# Patient Record
Sex: FEMALE | Race: BLACK OR AFRICAN AMERICAN | NOT HISPANIC OR LATINO | URBAN - METROPOLITAN AREA
[De-identification: names, ages, dates, MRNs, and addresses within clinical notes are randomized per-mention and may not be internally consistent; named-entity substitution may affect disease eponyms.]

---

## 2020-10-01 ENCOUNTER — OFFICE VISIT - HEALTHEAST (OUTPATIENT)
Dept: FAMILY MEDICINE | Facility: CLINIC | Age: 25
End: 2020-10-01

## 2020-10-01 DIAGNOSIS — Z00.00 ROUTINE GENERAL MEDICAL EXAMINATION AT A HEALTH CARE FACILITY: ICD-10-CM

## 2020-10-01 DIAGNOSIS — Z30.432 ENCOUNTER FOR REMOVAL OF INTRAUTERINE CONTRACEPTIVE DEVICE (IUD): ICD-10-CM

## 2020-10-01 DIAGNOSIS — Z11.3 ROUTINE SCREENING FOR STI (SEXUALLY TRANSMITTED INFECTION): ICD-10-CM

## 2020-10-01 DIAGNOSIS — E66.01 MORBID OBESITY WITH BMI OF 50.0-59.9, ADULT (H): ICD-10-CM

## 2020-10-01 DIAGNOSIS — Z30.09 COUNSELING FOR BIRTH CONTROL REGARDING INTRAUTERINE DEVICE (IUD): ICD-10-CM

## 2020-10-01 LAB
CHOLEST SERPL-MCNC: 193 MG/DL
CLUE CELLS: NORMAL
FASTING STATUS PATIENT QL REPORTED: YES
HBA1C MFR BLD: 5.3 %
HDLC SERPL-MCNC: 45 MG/DL
HIV 1+2 AB+HIV1 P24 AG SERPL QL IA: NEGATIVE
LDLC SERPL CALC-MCNC: 138 MG/DL
TRICHOMONAS, WET PREP: NORMAL
TRIGL SERPL-MCNC: 52 MG/DL
YEAST, WET PREP: NORMAL

## 2020-10-01 ASSESSMENT — MIFFLIN-ST. JEOR: SCORE: 2151.89

## 2020-10-02 LAB
C TRACH DNA SPEC QL PROBE+SIG AMP: NEGATIVE
N GONORRHOEA DNA SPEC QL NAA+PROBE: NEGATIVE
T PALLIDUM AB SER QL: NEGATIVE

## 2020-10-05 ENCOUNTER — COMMUNICATION - HEALTHEAST (OUTPATIENT)
Dept: FAMILY MEDICINE | Facility: CLINIC | Age: 25
End: 2020-10-05

## 2020-10-05 LAB
BKR LAB AP ABNORMAL BLEEDING: NO
BKR LAB AP BIRTH CONTROL/HORMONES: NORMAL
BKR LAB AP CERVICAL APPEARANCE: NORMAL
BKR LAB AP GYN ADEQUACY: NORMAL
BKR LAB AP GYN INTERPRETATION: NORMAL
BKR LAB AP GYN OTHER FINDINGS: NORMAL
BKR LAB AP HPV REFLEX: NORMAL
BKR LAB AP LMP: NORMAL
BKR LAB AP PATIENT STATUS: NORMAL
BKR LAB AP PREVIOUS ABNORMAL: NO
BKR LAB AP PREVIOUS NORMAL: NORMAL
HIGH RISK?: YES
PATH REPORT.COMMENTS IMP SPEC: NORMAL
RESULT FLAG (HE HISTORICAL CONVERSION): NORMAL

## 2020-10-08 ENCOUNTER — COMMUNICATION - HEALTHEAST (OUTPATIENT)
Dept: FAMILY MEDICINE | Facility: CLINIC | Age: 25
End: 2020-10-08

## 2021-06-04 VITALS
HEART RATE: 88 BPM | OXYGEN SATURATION: 99 % | WEIGHT: 293 LBS | TEMPERATURE: 98.4 F | DIASTOLIC BLOOD PRESSURE: 72 MMHG | BODY MASS INDEX: 47.09 KG/M2 | HEIGHT: 66 IN | SYSTOLIC BLOOD PRESSURE: 120 MMHG

## 2021-06-11 NOTE — PROGRESS NOTES
Assessment & Plan:     1. Routine screening for STI (sexually transmitted infection)  Last sexually active in 2018 and didn't have repeat chlamydia screening for test of cure. She would like to do blood screening in addition.   - Chlamydia trachomatis & Neisseria gonorrhoeae, Amplified Detection  - HIV Antigen/Antibody Screening [place]  - Syphilis Screen, [place]  - Wet Prep, Vaginal    2. Routine general medical examination at a health care facility  - Gynecologic Cytology (PAP Smear)    3. Morbid obesity with BMI of 50.0-59.9, adult (H)  I provided counseling regarding nutrition and exercise for weight loss. She had elevated LDL cholesterol in 2018 and is at risk for diabetes given family history and elevated BMI. Agreeable for screening today.  - Lipid [place] RANDOM  - Glycosylated Hemoglobin A1c    4. Counseling for birth control regarding intrauterine device (IUD)    5. Encounter for removal of intrauterine contraceptive device (IUD)  I counseled her regarding birth control options including risks and benefits of each. She is aware that she can become pregnant once the IUD is removed and wishes to proceed. Birth control plan is abstinence.    PROCEDURE:   The speculum was placed and the IUD string visualized.  Using ringed forceps, the IUD string was grasped and the device was removed without difficulty.  Bleeding was minimal. The patient tolerated the procedure well. After care instructions were provided.      Subjective:       Analy Leyva is a 24 y.o. female who presents for an annual exam. She has the following concerns:    1. IUD - She would like her IUD removed. This was placed a few years ago when she was sexually active. She has not been active since 2018 and plans to delay intercourse until marriage. She has some cramps around her period with the IUD. However,she does plan to consider the IUD again when she becomes sexually active.     2. STI screening - In 2018, she had chlamydia. She was  treated at her clinic but wasn't retested. She would like to be retested. She is unsure if they also tested for syphilis and HIV at that time, so she would like to repeat these in additional to the vaginal testing. No vaginal itching, discharge, odor currently although she had discharge last month.    Healthy Habits:   Regular Exercise: Yes, walking several days a week  Healthy Diet: Currently doing a Andrew fast, mostly vegan. Working on getting enough protein. She is trying to lose weight. She hopes to do this for 100 days. So far, she is feeling well with it.  Dental Visits Regularly: No but plans to now  Seat Belt: Yes  Sexually active: No  Self Breast Exam Monthly:No  Lipid Profile: Yes and high cholesterol in 2018  Glucose Screen: Agreeable to screening today  Prevention of Osteoporosis: Nutrition, exercise  Last Dexa: N/A  Guns at Home:  No       Gynecologic History  Patient's last menstrual period was 09/06/2020.  Contraception: IUD  Last Pap: 8/6/2018 and NILM. Results were: normal  Last mammogram: N/A.     OB History   No obstetric history on file.       No current outpatient medications on file.     No current facility-administered medications for this visit.      History reviewed. No pertinent past medical history.  Past Surgical History:   Procedure Laterality Date     WISDOM TOOTH EXTRACTION       Patient has no known allergies.  Family History   Problem Relation Age of Onset     Diabetes type II Mother      Social History     Socioeconomic History     Marital status: Single     Spouse name: Not on file     Number of children: Not on file     Years of education: Not on file     Highest education level: Not on file   Occupational History     Not on file   Social Needs     Financial resource strain: Not on file     Food insecurity     Worry: Not on file     Inability: Not on file     Transportation needs     Medical: Not on file     Non-medical: Not on file   Tobacco Use     Smoking status: Never Smoker  "    Smokeless tobacco: Never Used     Tobacco comment: NO passive exposure   Substance and Sexual Activity     Alcohol use: Not Currently     Drug use: Not Currently     Sexual activity: Not on file   Lifestyle     Physical activity     Days per week: Not on file     Minutes per session: Not on file     Stress: Not on file   Relationships     Social connections     Talks on phone: Not on file     Gets together: Not on file     Attends Taoism service: Not on file     Active member of club or organization: Not on file     Attends meetings of clubs or organizations: Not on file     Relationship status: Not on file     Intimate partner violence     Fear of current or ex partner: Not on file     Emotionally abused: Not on file     Physically abused: Not on file     Forced sexual activity: Not on file   Other Topics Concern     Not on file   Social History Narrative     Not on file       Review of Systems  General:  Denies problem  Eyes: Denies problem  Ears/Nose/Throat: Denies problem  Cardiovascular: Denies problem  Respiratory:  Denies problem  Gastrointestinal:  Denies problem, Genitourinary: Denies problem  Musculoskeletal:  Denies problem  Skin: Denies problem  Neurologic: Denies problem  Psychiatric: Denies problem  Endocrine: Denies problem  Heme/Lymphatic: Denies problem   Allergic/Immunologic: Denies problem         Objective:         Vitals:    10/01/20 0916   BP: 120/72   Pulse: 88   Temp: 98.4  F (36.9  C)   TempSrc: Oral   SpO2: 99%   Weight: (!) 306 lb 4 oz (138.9 kg)   Height: 5' 5.75\" (1.67 m)       Physical Exam:  General Appearance: Alert, cooperative, no distress, appears stated age  Head: Normocephalic, without obvious abnormality, atraumatic  Eyes: PERRL, conjunctiva/corneas clear, EOM's intact  Ears: Normal TM's and external ear canals, both ears  Nose: Nares normal, septum midline,mucosa normal, no drainage  Throat: Lips, mucosa, and tongue normal; teeth and gums normal  Neck: Supple, " symmetrical, trachea midline, no adenopathy;  thyroid: not enlarged, symmetric, no tenderness/mass/nodules  Back: Symmetric  Lungs: Clear to auscultation bilaterally, respirations unlabored  Breasts: No breast masses, tenderness, asymmetry, or nipple discharge.  Heart: Regular rate and rhythm, S1 and S2 normal, no murmur, rub, or gallop, Abdomen: Soft, non-tender,  no masses, no organomegaly  Pelvic:Normally developed genitalia with no external lesions or eruptions. Vagina and cervix show no lesions, inflammation, discharge or tenderness.  No adnexal mass or tenderness.  Extremities: Extremities normal, atraumatic, no cyanosis or edema  Skin: Skin color, texture, turgor normal, no rashes or lesions  Lymph nodes: Cervical, supraclavicular, and axillary nodes normal  Neurologic: AOx3, normal tone throughout, normal gait    No results found for this or any previous visit.        Priscilla Silveira MD   [facility]

## 2021-06-11 NOTE — PATIENT INSTRUCTIONS - HE
Green smoothies: handful of greens (spinach, chard, kale, mixed greens), frozen or fresh berries, nut milk, nuts or seeds/nut butter, avocado. You might consider adding in some protein sources such as eggs, fish, turkey. Or look into a vegan protein powder.

## 2021-06-12 NOTE — PROGRESS NOTES
"Please call and let patient know her STD testing is negative, which is good. Her blood sugar check was also normal. Her cholesterol panel isn't totally normal but it's not too bad. There is room to improve her LDL cholesterol (the \"bad\" cholesterol). Continue with good nutrition and physical activity and follow up as needed or if she would like to have another visit to talk about these things in more depth. Thanks.    Priscilla Silveira MD     "

## 2021-06-12 NOTE — TELEPHONE ENCOUNTER
"----- Message from Priscilla Silveira MD sent at 10/2/2020  3:02 PM CDT -----  Please call and let patient know her STD testing is negative, which is good. Her blood sugar check was also normal. Her cholesterol panel isn't totally normal but it's not too bad. There is room to improve her LDL cholesterol (the \"bad\" cholesterol). Continue with good nutrition and physical activity and follow up as needed or if she would like to have another visit to talk about these things in more depth. Thanks.    Priscilla Silveira MD       "

## 2021-06-20 NOTE — LETTER
Letter by Priscilla Silveira MD at      Author: Priscilla Silveira MD Service: -- Author Type: --    Filed:  Encounter Date: 10/8/2020 Status: (Other)         Analy Leyva  [address] [place] [address]             October 8, 2020         Dear Ms. Leyva,    We are happy to inform you that your recent Pap smear is normal.    It is recommended that you have your next Pap smear in 3 years. You will also need to return to the clinic every year for an annual wellness visit.    If you have additional questions regarding this result, please contact our office and we will be happy to assist you.      Sincerely,    Your [facility] Team

## 2021-06-26 ENCOUNTER — HEALTH MAINTENANCE LETTER (OUTPATIENT)
Age: 26
End: 2021-06-26

## 2021-10-11 ENCOUNTER — HEALTH MAINTENANCE LETTER (OUTPATIENT)
Age: 26
End: 2021-10-11

## 2021-12-05 ENCOUNTER — HEALTH MAINTENANCE LETTER (OUTPATIENT)
Age: 26
End: 2021-12-05

## 2022-09-25 ENCOUNTER — HEALTH MAINTENANCE LETTER (OUTPATIENT)
Age: 27
End: 2022-09-25

## 2023-01-30 ENCOUNTER — HEALTH MAINTENANCE LETTER (OUTPATIENT)
Age: 28
End: 2023-01-30

## 2024-03-02 ENCOUNTER — HEALTH MAINTENANCE LETTER (OUTPATIENT)
Age: 29
End: 2024-03-02